# Patient Record
Sex: MALE | Race: WHITE
[De-identification: names, ages, dates, MRNs, and addresses within clinical notes are randomized per-mention and may not be internally consistent; named-entity substitution may affect disease eponyms.]

---

## 2019-11-09 ENCOUNTER — HOSPITAL ENCOUNTER (EMERGENCY)
Dept: HOSPITAL 92 - ERS | Age: 74
Discharge: HOME | End: 2019-11-09
Payer: MEDICARE
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
## 2019-11-09 DIAGNOSIS — E11.9: ICD-10-CM

## 2019-11-09 DIAGNOSIS — F31.9: ICD-10-CM

## 2019-11-09 DIAGNOSIS — F03.90: ICD-10-CM

## 2019-11-09 DIAGNOSIS — S43.402A: Primary | ICD-10-CM

## 2019-11-09 PROCEDURE — 71045 X-RAY EXAM CHEST 1 VIEW: CPT

## 2019-11-09 PROCEDURE — 72125 CT NECK SPINE W/O DYE: CPT

## 2019-11-09 PROCEDURE — 70450 CT HEAD/BRAIN W/O DYE: CPT

## 2019-11-09 NOTE — RAD
EXAM:

XR Shoulder Lt 3 View STANDARD



PROVIDED CLINICAL HISTORY:

Pain



FINDINGS:

There is no evidence for fracture or other acute osseous abnormality. Alignment appears anatomic. Gle
nohumeral degenerative changes are seen.



IMPRESSION:

No evidence for an acute osseous abnormality. If there is persistent clinical concern, conservative m
anagement and follow-up imaging advised.



Reported By: Percy Ann 

Electronically Signed:  11/9/2019 10:57 AM

## 2019-11-09 NOTE — CT
NONCONTRAST CT HEAD:

 

Date:  11/09/19 

 

HISTORY:  

Injury after a fall. 

 

COMPARISON:  

06/28/19. 

 

FINDINGS:

Again noted is diminished attenuation of the periventricular white matter which is nonspecific, but l
ikely reflective of chronic small vessel ischemic changes. Low density areas are again seen in each b
tung ganglia, likely related to remote lacunar infarctions. There is no evidence of an acute cortical
 infarction, hemorrhage, mass effect, or midline shift. There is cerebral volume loss again present. 
The ventricular system is normal in size, shape, and position for the degree of sulcal atrophy. No ca
lvarial fracture is seen. Visualized paranasal sinuses and mastoid air cells are clear. 

 

IMPRESSION: 

Stable CT head without evidence of an acute intracranial abnormality demonstrated. 

 

 

POS: OFF

## 2019-11-09 NOTE — CT
EXAM:

CT scan cervical spineWithout contrast:





HISTORY:

Injury from a fall out of a wheelchair



COMPARISON:

None



FINDINGS:

No evidence for acute fracture or facet dislocation.

0.3 cm anterolisthesis of C2 on C3.

No prevertebral soft tissue swelling.

Very severe generalized disc osteophytosis and facet arthrosis with multilevel foraminal and lateral 
recess stenosis.



IMPRESSION:

No evidence for acute fracture or facet dislocation or other significant acute process.

Severe spondylosis. 0.3 cm mild anterolisthesis of C2 on C3.



Reported By: Landon Oneill 

Electronically Signed:  11/9/2019 11:31 AM

## 2019-11-09 NOTE — RAD
EXAM:

Portable chest



PROVIDED CLINICAL HISTORY:

Fall



COMPARISON:

6/28/2019



FINDINGS:

Cardiac and mediastinal silhouette is unchanged in appearance. No focal consolidation, pleural fluid 
or pneumothorax evident.



IMPRESSION:

No evidence for an acute cardiopulmonary process.



Reported By: Percy Ann 

Electronically Signed:  11/9/2019 10:56 AM

## 2019-11-17 ENCOUNTER — HOSPITAL ENCOUNTER (EMERGENCY)
Dept: HOSPITAL 92 - ERS | Age: 74
Discharge: NURSING, REHAB OR LONG-TERM CARE | End: 2019-11-17
Payer: MEDICARE

## 2019-11-17 DIAGNOSIS — Z79.84: ICD-10-CM

## 2019-11-17 DIAGNOSIS — F31.9: ICD-10-CM

## 2019-11-17 DIAGNOSIS — F41.9: ICD-10-CM

## 2019-11-17 DIAGNOSIS — F03.90: ICD-10-CM

## 2019-11-17 DIAGNOSIS — E11.9: ICD-10-CM

## 2019-11-17 DIAGNOSIS — Z79.899: ICD-10-CM

## 2019-11-17 DIAGNOSIS — Z79.82: ICD-10-CM

## 2019-11-17 DIAGNOSIS — R45.6: Primary | ICD-10-CM

## 2019-11-17 DIAGNOSIS — G47.00: ICD-10-CM

## 2019-11-17 LAB
ALBUMIN SERPL BCG-MCNC: 3.8 G/DL (ref 3.4–4.8)
ALP SERPL-CCNC: 61 U/L (ref 40–110)
ALT SERPL W P-5'-P-CCNC: 22 U/L (ref 8–55)
ANION GAP SERPL CALC-SCNC: 13 MMOL/L (ref 10–20)
APAP SERPL-MCNC: (no result) MCG/ML (ref 10–30)
AST SERPL-CCNC: 15 U/L (ref 5–34)
BASOPHILS # BLD AUTO: 0 THOU/UL (ref 0–0.2)
BASOPHILS NFR BLD AUTO: 0.3 % (ref 0–1)
BILIRUB SERPL-MCNC: 0.3 MG/DL (ref 0.2–1.2)
BUN SERPL-MCNC: 16 MG/DL (ref 8.4–25.7)
CALCIUM SERPL-MCNC: 8.9 MG/DL (ref 7.8–10.44)
CHLORIDE SERPL-SCNC: 99 MMOL/L (ref 98–107)
CO2 SERPL-SCNC: 33 MMOL/L (ref 23–31)
CREAT CL PREDICTED SERPL C-G-VRATE: 0 ML/MIN (ref 70–130)
DRUG SCREEN CUTOFF: (no result)
EOSINOPHIL # BLD AUTO: 0.1 THOU/UL (ref 0–0.7)
EOSINOPHIL NFR BLD AUTO: 1.3 % (ref 0–10)
GLOBULIN SER CALC-MCNC: 2.4 G/DL (ref 2.4–3.5)
GLUCOSE SERPL-MCNC: 104 MG/DL (ref 83–110)
HGB BLD-MCNC: 13.5 G/DL (ref 14–18)
LYMPHOCYTES # BLD: 1.6 THOU/UL (ref 1.2–3.4)
LYMPHOCYTES NFR BLD AUTO: 23 % (ref 21–51)
MCH RBC QN AUTO: 31.6 PG (ref 27–31)
MCV RBC AUTO: 93.8 FL (ref 78–98)
MEDTOX CONTROL LINE VALID?: (no result)
MEDTOX READER #: (no result)
MONOCYTES # BLD AUTO: 0.5 THOU/UL (ref 0.11–0.59)
MONOCYTES NFR BLD AUTO: 7.3 % (ref 0–10)
NEUTROPHILS # BLD AUTO: 4.6 THOU/UL (ref 1.4–6.5)
NEUTROPHILS NFR BLD AUTO: 68 % (ref 42–75)
PLATELET # BLD AUTO: 171 THOU/UL (ref 130–400)
POTASSIUM SERPL-SCNC: 3.9 MMOL/L (ref 3.5–5.1)
RBC # BLD AUTO: 4.27 MILL/UL (ref 4.7–6.1)
SALICYLATES SERPL-MCNC: (no result) MG/DL (ref 15–30)
SODIUM SERPL-SCNC: 141 MMOL/L (ref 136–145)
WBC # BLD AUTO: 6.8 THOU/UL (ref 4.8–10.8)

## 2019-11-17 PROCEDURE — 36415 COLL VENOUS BLD VENIPUNCTURE: CPT

## 2019-11-17 PROCEDURE — 80307 DRUG TEST PRSMV CHEM ANLYZR: CPT

## 2019-11-17 PROCEDURE — 84443 ASSAY THYROID STIM HORMONE: CPT

## 2019-11-17 PROCEDURE — 80306 DRUG TEST PRSMV INSTRMNT: CPT

## 2019-11-17 PROCEDURE — 80053 COMPREHEN METABOLIC PANEL: CPT

## 2019-11-17 PROCEDURE — 81003 URINALYSIS AUTO W/O SCOPE: CPT

## 2019-11-17 PROCEDURE — 85025 COMPLETE CBC W/AUTO DIFF WBC: CPT

## 2019-11-17 PROCEDURE — 70450 CT HEAD/BRAIN W/O DYE: CPT

## 2019-11-17 NOTE — CT
CT BRAIN WITHOUT CONTRAST:



HISTORY:Altered mental status



COMPARISON:11/9/2019



FINDINGS:

There are foci of decreased attenuation in the periventricular white matter, consistent with chronic 
small vessel ischemic disease. Old lacunar infarctions in the basal ganglia are again seen.



No evidence of acute infarct, hemorrhage, midline shift or abnormal extra-axial fluid collections is 
seen. The ventricular size is appropriate and the basilar cisterns are patent. The bony calvarium

is intact.



The visualized paranasal sinuses and mastoid air cells are well aerated.



IMPRESSION: No CT evidence of acute intracranial process.



Reported By: Ric Cortez 

Electronically Signed:  11/17/2019 5:20 PM